# Patient Record
(demographics unavailable — no encounter records)

---

## 2024-11-25 NOTE — REVIEW OF SYSTEMS
[Hearing Loss] : hearing loss [Muscle Weakness] : muscle weakness [Negative] : Heme/Lymph [Chest Pain] : no chest pain [Shortness Of Breath] : no shortness of breath [Cough] : no cough [Constipation] : no constipation [Vomiting] : no vomiting [Incontinence] : no incontinence [Hematuria] : no hematuria [Muscle Pain] : no muscle pain [Itching] : no itching [Skin Rash] : no skin rash [Headache] : no headache [Unsteady Walk] : no ataxia [Insomnia] : no insomnia [FreeTextEntry4] : rt ear [FreeTextEntry9] : LINH

## 2024-11-25 NOTE — CURRENT MEDS
[Takes medication as prescribed] : takes [None] : Patient does not have any barriers to medication adherence [Yes] : Reviewed medication list for presence of high-risk medications. [FreeTextEntry1] : Keppra

## 2024-11-25 NOTE — PHYSICAL EXAM
[No Acute Distress] : no acute distress [Well Nourished] : well nourished [Normal Sclera/Conjunctiva] : normal sclera/conjunctiva [Normal Outer Ear/Nose] : the outer ears and nose were normal in appearance [No JVD] : no jugular venous distention [No Respiratory Distress] : no respiratory distress  [No Accessory Muscle Use] : no accessory muscle use [No Edema] : there was no peripheral edema [Normal] : normal gait, coordination grossly intact, no focal deficits and deep tendon reflexes were 2+ and symmetric [Alert and Oriented x3] : oriented to person, place, and time [de-identified] : rtt facial droop, expressive aphagia [de-identified] : has word finding issues, son assists

## 2024-11-25 NOTE — HISTORY OF PRESENT ILLNESS
[Home] : at home, [unfilled] , at the time of the visit. [Other Location: e.g. Home (Enter Location, City,State)___] : at [unfilled] [Verbal consent obtained from patient] : the patient, [unfilled] [FreeTextEntry1] : F/u post hospitalization at [facility] from 14-Nov-2024 - 18-Nov-2024 for Seizure. [de-identified] : This patient is Enrolled in the Post-Discharge [facility] Home Care Services Follow Up Program through [facility] for Continuity of Care S/P Recent Hospitalization until seen by PCP. Brief Hospital Course copied: 69 y/o Male, PMHx Left frontal/temporal hemorrhagic stroke 09/2023, HTN, sciatica/multiple spinal disc herniations with chronic back pain, Left shoulder injury, Left wrist fracture, Dupuytren contractures Right hand, prior EtOH abuse, past smoker quit 15 years ago (2 PPD x 25 years), presented to [facility] ED 11/13/24 complaining of episode of sudden-onset Right eye and facial twitching; patient remained awake throughout episode and appeared to be able to hear his son but unable to speak, and "was able to lift his arms." Patient has residual mild Right facial palsy, Right hand numbness, and mild to moderate aphasia from prior ICH. Patient seemed to be confused after episode and had severe Right sided facial droop after event per son. Patient was brought to ED by Mobile Stroke Unit, per EMS there was noted hyperdensity on CT Head, Code Stroke on arrival, NIHSS 2 per ED Provider for Right facial palsy and mild dysarthria. Last Known Well @ 15:30. CT Head with small area of acute left frontal contrast taining/enhancement vs hemorrhage. Per son, patient now back to baseline with exception of worsened expressive aphasia. Patient not a candidate for Tenecteplase due to concern for possible ICH. Patient not a candidate for mechanical thrombectomy due to no large vessel occlusion. Etiology of symptoms concerning for seizure activity given twitching episode with post-ictal confusion and return to relative baseline.   Tlevisit made with pt and his son Sammy Blankenship. Pt is alert and oriented x 3. He is breathing normally. Pt has word finding issues at times -son Sammy fills in the information. Pt denies any seizures since being home. Pt has rt sided weakness, instructed on fall precautions To keep f/u with NEURO, not to not run out of Keppa.  TCM NP reviewed that pt is under the [facility] program for home care until pt is seen by PCP. TCM NP will be assigned to sign Home Care orders post-discharge

## 2024-11-25 NOTE — ASSESSMENT
[FreeTextEntry1] : 67 y/o Male, PMHx Left frontal/temporal hemorrhagic stroke 09/2023, HTN, sciatica/multiple spinal disc herniations with chronic back pain, Left shoulder injury, Left wrist fracture, Dupuytren contractures Right hand, prior EtOH abuse, past smoker quit 15 years ago (2 PPD x 25 years), presented to [facility] ED 11/13/24 complaining of episode of sudden-onset Right eye and facial twitching;Mild diffuse/multi-focal cerebral dysfunction, not specific as to etiology. EEG d/c'd 11/16/24 in setting of negative MR imaging. Continue Keppra 750mg PO BID. S/p load Keppra 1g IVBP x1 dose.

## 2024-11-25 NOTE — ASSESSMENT
[FreeTextEntry1] : 67 y/o Male, PMHx Left frontal/temporal hemorrhagic stroke 09/2023, HTN, sciatica/multiple spinal disc herniations with chronic back pain, Left shoulder injury, Left wrist fracture, Dupuytren contractures Right hand, prior EtOH abuse, past smoker quit 15 years ago (2 PPD x 25 years), presented to [facility] ED 11/13/24 complaining of episode of sudden-onset Right eye and facial twitching;Mild diffuse/multi-focal cerebral dysfunction, not specific as to etiology. EEG d/c'd 11/16/24 in setting of negative MR imaging. Continue Keppra 750mg PO BID. S/p load Keppra 1g IVBP x1 dose.

## 2024-11-25 NOTE — ASSESSMENT
[FreeTextEntry1] : 67 y/o Male, PMHx Left frontal/temporal hemorrhagic stroke 09/2023, HTN, sciatica/multiple spinal disc herniations with chronic back pain, Left shoulder injury, Left wrist fracture, Dupuytren contractures Right hand, prior EtOH abuse, past smoker quit 15 years ago (2 PPD x 25 years), presented to [facility] ED 11/13/24 complaining of episode of sudden-onset Right eye and facial twitching;Mild diffuse/multi-focal cerebral dysfunction, not specific as to etiology. EEG d/c'd 11/16/24 in setting of negative MR imaging. Continue Keppra 750mg PO BID. S/p load Keppra 1g IVBP x1 dose.

## 2024-11-25 NOTE — HISTORY OF PRESENT ILLNESS
[Home] : at home, [unfilled] , at the time of the visit. [Other Location: e.g. Home (Enter Location, City,State)___] : at [unfilled] [Verbal consent obtained from patient] : the patient, [unfilled] [FreeTextEntry1] : F/u post hospitalization at [facility] from 14-Nov-2024 - 18-Nov-2024 for Seizure. [de-identified] : This patient is Enrolled in the Post-Discharge [facility] Home Care Services Follow Up Program through [facility] for Continuity of Care S/P Recent Hospitalization until seen by PCP. Brief Hospital Course copied: 69 y/o Male, PMHx Left frontal/temporal hemorrhagic stroke 09/2023, HTN, sciatica/multiple spinal disc herniations with chronic back pain, Left shoulder injury, Left wrist fracture, Dupuytren contractures Right hand, prior EtOH abuse, past smoker quit 15 years ago (2 PPD x 25 years), presented to [facility] ED 11/13/24 complaining of episode of sudden-onset Right eye and facial twitching; patient remained awake throughout episode and appeared to be able to hear his son but unable to speak, and "was able to lift his arms." Patient has residual mild Right facial palsy, Right hand numbness, and mild to moderate aphasia from prior ICH. Patient seemed to be confused after episode and had severe Right sided facial droop after event per son. Patient was brought to ED by Mobile Stroke Unit, per EMS there was noted hyperdensity on CT Head, Code Stroke on arrival, NIHSS 2 per ED Provider for Right facial palsy and mild dysarthria. Last Known Well @ 15:30. CT Head with small area of acute left frontal contrast taining/enhancement vs hemorrhage. Per son, patient now back to baseline with exception of worsened expressive aphasia. Patient not a candidate for Tenecteplase due to concern for possible ICH. Patient not a candidate for mechanical thrombectomy due to no large vessel occlusion. Etiology of symptoms concerning for seizure activity given twitching episode with post-ictal confusion and return to relative baseline.   Tlevisit made with pt and his son Sammy Blankenship. Pt is alert and oriented x 3. He is breathing normally. Pt has word finding issues at times -son Sammy fills in the information. Pt denies any seizures since being home. Pt has rt sided weakness, instructed on fall precautions To keep f/u with NEURO, not to not run out of Keppa.  TCM NP reviewed that pt is under the [facility] program for home care until pt is seen by PCP. TCM NP will be assigned to sign Home Care orders post-discharge

## 2024-11-25 NOTE — PHYSICAL EXAM
[No Acute Distress] : no acute distress [Well Nourished] : well nourished [Normal Sclera/Conjunctiva] : normal sclera/conjunctiva [Normal Outer Ear/Nose] : the outer ears and nose were normal in appearance [No JVD] : no jugular venous distention [No Respiratory Distress] : no respiratory distress  [No Accessory Muscle Use] : no accessory muscle use [No Edema] : there was no peripheral edema [Normal] : normal gait, coordination grossly intact, no focal deficits and deep tendon reflexes were 2+ and symmetric [Alert and Oriented x3] : oriented to person, place, and time [de-identified] : rtt facial droop, expressive aphagia [de-identified] : has word finding issues, son assists

## 2024-11-25 NOTE — PLAN
[FreeTextEntry1] : 1.  continue all medications as prescribed 2. f/u with /NEURO/CARDS/PCP 3. Maintain adequate nutrition and hydration and sleep 4. maintain seizure precautions - identify aura and prevention measures

## 2024-11-25 NOTE — HISTORY OF PRESENT ILLNESS
[Home] : at home, [unfilled] , at the time of the visit. [Other Location: e.g. Home (Enter Location, City,State)___] : at [unfilled] [Verbal consent obtained from patient] : the patient, [unfilled] [FreeTextEntry1] : F/u post hospitalization at [facility] from 14-Nov-2024 - 18-Nov-2024 for Seizure. [de-identified] : This patient is Enrolled in the Post-Discharge [facility] Home Care Services Follow Up Program through [facility] for Continuity of Care S/P Recent Hospitalization until seen by PCP. Brief Hospital Course copied: 67 y/o Male, PMHx Left frontal/temporal hemorrhagic stroke 09/2023, HTN, sciatica/multiple spinal disc herniations with chronic back pain, Left shoulder injury, Left wrist fracture, Dupuytren contractures Right hand, prior EtOH abuse, past smoker quit 15 years ago (2 PPD x 25 years), presented to [facility] ED 11/13/24 complaining of episode of sudden-onset Right eye and facial twitching; patient remained awake throughout episode and appeared to be able to hear his son but unable to speak, and "was able to lift his arms." Patient has residual mild Right facial palsy, Right hand numbness, and mild to moderate aphasia from prior ICH. Patient seemed to be confused after episode and had severe Right sided facial droop after event per son. Patient was brought to ED by Mobile Stroke Unit, per EMS there was noted hyperdensity on CT Head, Code Stroke on arrival, NIHSS 2 per ED Provider for Right facial palsy and mild dysarthria. Last Known Well @ 15:30. CT Head with small area of acute left frontal contrast taining/enhancement vs hemorrhage. Per son, patient now back to baseline with exception of worsened expressive aphasia. Patient not a candidate for Tenecteplase due to concern for possible ICH. Patient not a candidate for mechanical thrombectomy due to no large vessel occlusion. Etiology of symptoms concerning for seizure activity given twitching episode with post-ictal confusion and return to relative baseline.   Tlevisit made with pt and his son Sammy Blankenship. Pt is alert and oriented x 3. He is breathing normally. Pt has word finding issues at times -son Sammy fills in the information. Pt denies any seizures since being home. Pt has rt sided weakness, instructed on fall precautions To keep f/u with NEURO, not to not run out of Keppa.  TCM NP reviewed that pt is under the [facility] program for home care until pt is seen by PCP. TCM NP will be assigned to sign Home Care orders post-discharge

## 2024-11-25 NOTE — PHYSICAL EXAM
[No Acute Distress] : no acute distress [Well Nourished] : well nourished [Normal Sclera/Conjunctiva] : normal sclera/conjunctiva [Normal Outer Ear/Nose] : the outer ears and nose were normal in appearance [No JVD] : no jugular venous distention [No Respiratory Distress] : no respiratory distress  [No Accessory Muscle Use] : no accessory muscle use [No Edema] : there was no peripheral edema [Normal] : normal gait, coordination grossly intact, no focal deficits and deep tendon reflexes were 2+ and symmetric [Alert and Oriented x3] : oriented to person, place, and time [de-identified] : rtt facial droop, expressive aphagia [de-identified] : has word finding issues, son assists

## 2024-12-17 NOTE — DISCUSSION/SUMMARY
[FreeTextEntry1] : Mr. Garcia is a 67 yo male with history of HTN, prior alcohol abuse, who presented to [facility] on 9/7/2023 with aphasia and right-sided weakness and was found to have a large left frontal cortical intraparenchymal hemorrhage with new onset seizure 11/13/24 started on Keppra 750 mg BID.   I have personally reviewed available neuroradiological images. MRI brain w/w/o 11/16/24 - no evidence of acute infarct or acute hemorrhage.  EEG - sharp waves, left anterior temporal, F7 max, at times periodic near 0.25hz. Intermittent polymorphic delta slowing, focal, left frontotemporal. Background slowing, diffuse.   Historical: MRI brain w/wo contrast 10/8/24 revealed decrease in overall size of chronic left frontal parenchymal hemorrhage from prior MRI. No evidence of underlying enhancing lesion. Redemonstrated 3 mm inferiorly directed aneurysm arising from the right MCA bifurcation   Cerebral angiogram 9/8/2023 did not reveal a cause of the hemorrhage but showed an incidental 2 mm right MCA aneurysm.  BP in the office today is 106/62. Discussed the importance of continued blood pressure control.   PLAN: 1. Normotension.  2. Fall precautions.  3. Continue Keppra 750 mg BID 4. Routine EEG 5. Establish care with epilepsy, Dr. Cabrera 6. Continue OT/speech therapy- new referrals given 7. Repeat MRA brain 1 year for aneurysm surveillance   Follow up in 3 months or sooner if needed. All of the patient's questions and concerns were addressed.  Patient was educated about signs and symptoms of stroke and was counseled to contact 911 upon emergence of stroke-like symptoms.  The patient was asked not to drive, not to work in close proximity of machines with moving parts, not to swim unsupervised or to work at high places. The patient was to shower (without accumulation of water) instead of taking a bath if unsupervised. The patient agrees and conveys good understanding.

## 2024-12-17 NOTE — HISTORY OF PRESENT ILLNESS
[FreeTextEntry1] : Follow up chronic medical conditions  [de-identified] : Mr. MASON CHILDS is a 68 year male with a PMH of  HTN, prior alcohol abuse, intraparenchymal hemorrhage (09/2023) seeing neuro, has residual slurred speech, intermittent dizziness and hand numbness and Dupytren's in left hand who comes to the office accompanied by his son Mason for follow up of chronic medical conditions.

## 2024-12-17 NOTE — PLAN
[FreeTextEntry1] : History of brain hemorrhage Continue statins Follow up with neurology, neurosurgery and cardiology  HTN patient's BP well controlled with current medication. will continue current  regimen  Continue amlodipine current dose  History of seizure- patient will continue to take medication as prescribed by Neurologist  Prior to appointment and during encounter with patient extensive medical records were reviewed including but not limited to, Hospital records, out patient records, laboratory data and microbiology data    Total encounter total time 30 mins >50% of time spent counseling/coordinating care  Counseling included abnormal lab results, differential diagnoses, treatment options, risks and benefits, lifestyle changes, current condition, medications, and dose adjustments.  The patient was interactive, attentive, asked questions, and verbalized understanding

## 2024-12-17 NOTE — HEALTH RISK ASSESSMENT
[0] : 2) Feeling down, depressed, or hopeless: Not at all (0) [PHQ-2 Negative - No further assessment needed] : PHQ-2 Negative - No further assessment needed [No] : In the past 12 months have you used drugs other than those required for medical reasons? No [[id number]] : 0 [Former] : Former [10-14] : 10-14 [> 15 Years] : > 15 Years

## 2024-12-17 NOTE — PHYSICAL EXAM
[Normal] : affect was normal and insight and judgment were intact [de-identified] : Slurred speech.

## 2024-12-17 NOTE — HISTORY OF PRESENT ILLNESS
[FreeTextEntry1] : Mr. Garcia is a 69 yo male with history of HTN, prior alcohol abuse, who presented to [facility] on 9/7/2023 with aphasia and right-sided weakness and was found to have a large left frontal intraparenchymal hemorrhage. He presents today for follow up.   He presented to [facility] ED 11/13/24 with episode of sudden-onset right eye and facial twitching. Patient remained awake throughout episode and appeared to be able to hear his son but unable to speak with confusion following the episode. Patient was brought to ED by mobile stroke unit with questionable hyperdensity on CTH; however, low suspicion for new hemorrhage or infarct, likely contrast staining visualized on CTH. MRI brain w/w/o performed to assess for possible infarct or hemorrhage, personally reviewed with no evidence of acute infarct or acute hemorrhage. EEG revealed sharp waves, left anterior temporal, F7 max, at times periodic near 0.25hz. Intermittent polymorphic delta slowing, focal, left frontotemporal. Background slowing, diffuse. He was started on Keppra 750 mg BID. Feels fatigued on the medication. Mood okay. No seizures since. He is hoping to get home speech and OT for fine motor skills, feels both have slightly regressed. Dizziness resolved. No interval stroke symptoms, falls.

## 2024-12-17 NOTE — PHYSICAL EXAM
[FreeTextEntry1] : GENERAL APPEARANCE: Well developed, well-nourished man in no acute distress.  NEUROLOGIC EXAM:  MENTAL STATUS: Alert and Oriented to person, place and time. Mild to moderate expressive aphasia. Able to name, repeat and follow simple commands. Slow deliberate speech with mild paraphasia.   CRANIAL NERVES: CN 2:    Visual fields are full to confrontation. CN 3, 4, 6: Extraocular movements are intact. No nystagmus or ophthalmoplegia is evident. Pupils are equally round and reactive to light. CN 5:     Facial sensation is intact to light touch in all 3 divisions. CN 7:     Mild right facial droop at rest, activates well.   MOTOR: Strength is 5/5 throughout for age and stature. Dupuytren's contracture left 5th digit.  No orbiting or drift noted.  SENSORY: Intact to light touch perception in all four extremities without extinction to double simultaneous stimulation  COORD: Finger to nose testing without dysmetria bilaterally.  GAIT: Normal station. Ambulates with a single point cane with right limping gait.

## 2025-03-11 NOTE — PHYSICAL EXAM
[Well Developed] : well developed [Well Nourished] : well nourished [No Acute Distress] : no acute distress [Normal Conjunctiva] : normal conjunctiva [Normal Venous Pressure] : normal venous pressure [No Carotid Bruit] : no carotid bruit [Normal S1, S2] : normal S1, S2 [No Murmur] : no murmur [No Rub] : no rub [No Gallop] : no gallop [Clear Lung Fields] : clear lung fields [Good Air Entry] : good air entry [No Respiratory Distress] : no respiratory distress  [Soft] : abdomen soft [Non Tender] : non-tender [No Masses/organomegaly] : no masses/organomegaly [Normal Bowel Sounds] : normal bowel sounds [Abnormal Gait] : abnormal gait [No Edema] : no edema [No Cyanosis] : no cyanosis [No Clubbing] : no clubbing [No Varicosities] : no varicosities [No Rash] : no rash [No Skin Lesions] : no skin lesions [Moves all extremities] : moves all extremities [No Focal Deficits] : no focal deficits [Normal Speech] : normal speech [Alert and Oriented] : alert and oriented [Normal memory] : normal memory [de-identified] : using cane

## 2025-03-11 NOTE — PHYSICAL EXAM
[Well Developed] : well developed [Well Nourished] : well nourished [No Acute Distress] : no acute distress [Normal Conjunctiva] : normal conjunctiva [Normal Venous Pressure] : normal venous pressure [No Carotid Bruit] : no carotid bruit [Normal S1, S2] : normal S1, S2 [No Murmur] : no murmur [No Rub] : no rub [No Gallop] : no gallop [Clear Lung Fields] : clear lung fields [Good Air Entry] : good air entry [No Respiratory Distress] : no respiratory distress  [Soft] : abdomen soft [Non Tender] : non-tender [No Masses/organomegaly] : no masses/organomegaly [Normal Bowel Sounds] : normal bowel sounds [Abnormal Gait] : abnormal gait [No Edema] : no edema [No Cyanosis] : no cyanosis [No Clubbing] : no clubbing [No Varicosities] : no varicosities [No Rash] : no rash [No Skin Lesions] : no skin lesions [Moves all extremities] : moves all extremities [No Focal Deficits] : no focal deficits [Normal Speech] : normal speech [Alert and Oriented] : alert and oriented [Normal memory] : normal memory [de-identified] : using cane

## 2025-03-11 NOTE — HISTORY OF PRESENT ILLNESS
[FreeTextEntry1] : Mr. Garcia is a 67 yo male with history of HTN, prior alcohol abuse, who presented to [facility] on 9/7/2023 with aphasia and right-sided weakness and was found to have a large left frontal intraparenchymal hemorrhage. He presents today for follow up.   Routine EEG 2/22/25- focal cerebral dysfunction and increased epileptic potential in the left frontal region, intermittent left frontal rhythmic delta activity. Patient has appointment with Epilepsy, SILVER Corona right after this.   He remains on Keppra 750 mg BID, still fatigued but nothing worsening. No changes in mood. No seizures since. No interval stroke symptoms, falls.

## 2025-03-11 NOTE — CARDIOLOGY SUMMARY
[de-identified] : 3/11/25- Sinus 122, normal axis, Q-wave inferior leads, no ST abnormality, QTc 414 [de-identified] : 11/16/24: LV EF 64%, mild MR/AI

## 2025-03-11 NOTE — DISCUSSION/SUMMARY
[FreeTextEntry1] : Mr. Garcia is a 69 yo male with history of HTN, prior alcohol abuse, who presented to [facility] on 9/7/2023 with aphasia and right-sided weakness and was found to have a large left frontal cortical intraparenchymal hemorrhage with new onset seizure 11/13/24 started on Keppra 750 mg BID.   Historical: MRI brain w/w/o 11/16/24 - no evidence of acute infarct or acute hemorrhage.  EEG 11/2024 - sharp waves, left anterior temporal, F7 max, at times periodic near 0.25hz. Intermittent polymorphic delta slowing, focal, left frontotemporal. Background slowing, diffuse.   MRI brain w/wo contrast 10/8/24 revealed decrease in overall size of chronic left frontal parenchymal hemorrhage from prior MRI. No evidence of underlying enhancing lesion. Redemonstrated 3 mm inferiorly directed aneurysm arising from the right MCA bifurcation   Cerebral angiogram 9/8/2023 did not reveal a cause of the hemorrhage but showed an incidental 2 mm right MCA aneurysm.  BP in the office today is 114/70. Discussed the importance of continued blood pressure control.   Patient's last vessel imaging was 11/2024, CTA head with [facility]. Will plan for MRA brain wo contrast 11/2025 for continued surveillance of right MCA aneurysm.  PLAN: 1. Normotension.  2. Fall precautions.  3. Pending Epilepsy appointment after this to discuss continuation or any change in antiepileptic medication regimen 4. MRA brain wo contrast 11/2025 for aneurysm surveillance  Follow up in November 2025 after repeat imaging. All questions and concerns were addressed.  Patient was educated about signs and symptoms of stroke and was counseled to contact 911 upon emergence of stroke-like symptoms.  The patient was asked not to drive, not to work in close proximity of machines with moving parts, not to swim unsupervised or to work at high places. The patient was to shower (without accumulation of water) instead of taking a bath if unsupervised. The patient agrees and conveys good understanding.

## 2025-03-11 NOTE — PHYSICAL EXAM
[FreeTextEntry1] : GENERAL APPEARANCE: Well developed, well-nourished man in no acute distress.  NEUROLOGIC EXAM:  MENTAL STATUS: Alert and Oriented to person, place and time. Mild expressive aphasia. Able to name, repeat and follow simple commands. Slow deliberate speech with mild paraphasia.   CRANIAL NERVES: CN 2:    Visual fields are full to confrontation. CN 3, 4, 6: Extraocular movements are intact. No nystagmus or ophthalmoplegia is evident. Pupils are equally round and reactive to light. CN 5:     Facial sensation is intact to light touch in all 3 divisions. CN 7:     Mild right facial droop at rest, activates well.   MOTOR: Strength is 5/5 throughout for age and stature. Dupuytren's contracture left 5th digit.  No orbiting or drift noted.  SENSORY: Intact to light touch perception in all four extremities without extinction to double simultaneous stimulation  COORD: Finger to nose testing without dysmetria bilaterally.  GAIT: Normal station. Ambulates with a single point cane with right limping gait.

## 2025-03-11 NOTE — DATA REVIEWED
[de-identified] : Abnormal EEG in the awake, drowsy states. 1. Intermittent left frontal rhythmic delta activity.

## 2025-03-11 NOTE — HISTORY OF PRESENT ILLNESS
[FreeTextEntry1] : 68M h/o former smoker, former alcohol abuse, HTN, HLD, was planning for Dupuytren contracture surgery with preop EKG done with Q-wave pattern had cardiac evaluation in 2/2023 with normal Holter and LV EF 50-55% but unable to exclude regional wall abnormalities then repeat Echo in 7/2024 with normal LV EF 64% with mild MR and AI, hemorrhagic stroke 9/2023 (risk factors HTN, alcohol and frequent ASA use), seizure, presents for cardiology evaluation.    Patient presents with his adopted son (same name except Sammy ACOSTA) for the visit, he reports chronic exertional dyspnea on ambulation with chronic back pain and sciatica, limited ambulation using cane to ambulate with R-leg pain, doing light house chores only. He did not undergo the Dupuytren contract surgery after the CVA. Review of prior CT chest in 9/2023 noted diffuse coronary artery calcifications.  Stopped drinking alcohol since the stroke  Lab work 12/2024: TSH 1.01, A1c 5.7%, LDL 78   No CAD/stroke in family  Quit smoking 15yrs ago  No alcohol use since stroke from 2023

## 2025-03-11 NOTE — CARDIOLOGY SUMMARY
[de-identified] : 3/11/25- Sinus 122, normal axis, Q-wave inferior leads, no ST abnormality, QTc 414 [de-identified] : 11/16/24: LV EF 64%, mild MR/AI

## 2025-03-11 NOTE — HISTORY OF PRESENT ILLNESS
[FreeTextEntry1] : Mr. Garcia is a 69 yo male with history of HTN, prior alcohol abuse, who presented to [facility] on 9/7/2023 with aphasia and right-sided weakness and was found to have a large left frontal intraparenchymal hemorrhage. He presents today for follow up.   Routine EEG 2/22/25- focal cerebral dysfunction and increased epileptic potential in the left frontal region, intermittent left frontal rhythmic delta activity. Patient has appointment with Epilepsy, SILVER Corona right after this.   He remains on Keppra 750 mg BID, still fatigued but nothing worsening. No changes in mood. No seizures since. No interval stroke symptoms, falls.

## 2025-03-11 NOTE — HISTORY OF PRESENT ILLNESS
[FreeTextEntry1] : 67 yo male with history of HTN, prior alcohol abuse,   left frontal intraparenchymal hemorrhage 9/7/2023, seizure presents today for  seizures management.   Patient reports  on 11./13/24 he was   watching TV then  his right eye/ face started twitching, then jaw  clenched. was able to alert his son,   but was not  able to speak. was back to his baseline in EMS which was around 10mins. Taken  to [facility]  MRI brain w/w/o performed with no acute findings. 24hr  EEG revealed sharp waves, left anterior temporal  stated  on Keppra. initially was irritable on meds, but has been tolerating medications. He can take a nap after morning medications, then fine for the rest of the day. He has not had any recurrent sz, or sz-like activity. He would like to lower LEV. sleeps well.  mood is stable.      CURRENT ASM: LEV 750mg BID   EPILEPSY TYPE:  focal  HISTORY OF TONIC-CLONIC SZ:  no HISTORY OF STATUS EPILEPTICUS:  no  SEIZURE RISK FACTORS: Stroke  PREVIOUS ASM: None   IMAGING:  Brain  MRI 10/24: 1.  Chronic left frontal parenchymal hemorrhage is overall decrease in size from prior MRI. No evidence of underlying enhancing lesion. 2.  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 1. Intermittent left frontal rhythmic delta activity.  11/2025: EEG revealed sharp waves, left anterior temporal, F7 max, at times periodic near 0.25hz. Intermittent polymorphic delta slowing, focal, left frontotemporal. Background slowing, diffuse.   NEUROPSYCHOLOGY:

## 2025-03-11 NOTE — ASSESSMENT
[FreeTextEntry1] : 69 yo male with history of HTN, prior alcohol abuse, LHemorrhage. 11/213/24 with sudden onset focal aware sz-Rt facial spasms jaw clenching lasting total of 10 mins, EEG with EEG revealed sharp waves, left anterior temporal region, started on LEV 750mg BID with no recurrent events. repeat routine EEG increased LFT epileptic potential. wants to decrease medications. will obtain levels and will consider 500mg -750mg, ER can be considered,but concerned of pricing.   continue LEV 750mg BID BW for levels f/u  3mth

## 2025-03-11 NOTE — PHYSICAL EXAM
[General Appearance - In No Acute Distress] : in no acute distress [Affect] : the affect was normal [Mood] : the mood was normal [Person] : oriented to person [Place] : oriented to place [Time] : oriented to time [Current Events] : adequate knowledge of current events [Cranial Nerves Optic (II)] : visual acuity intact bilaterally,  visual fields full to confrontation, pupils equal round and reactive to light [Cranial Nerves Oculomotor (III)] : extraocular motion intact [Cranial Nerves Trigeminal (V)] : facial sensation intact symmetrically [Cranial Nerves Facial (VII)] : face symmetrical [Cranial Nerves Glossopharyngeal (IX)] : tongue and palate midline [Cranial Nerves Accessory (XI - Cranial And Spinal)] : head turning and shoulder shrug symmetric [Cranial Nerves Hypoglossal (XII)] : there was no tongue deviation with protrusion [Motor Strength] : muscle strength was normal in all four extremities [Motor Handedness Right-Handed] : the patient is right hand dominant [Sensation Tactile Decrease] : light touch was intact [] : no respiratory distress [Involuntary Movements] : no involuntary movements were seen [FreeTextEntry4] : mild-mod expressive language skills [FreeTextEntry8] : gait assitted with cane [FreeTextEntry9] : DTR symm decre.,

## 2025-03-11 NOTE — REVIEW OF SYSTEMS
[As Noted in HPI] : as noted in HPI [FreeTextEntry2] : 10 systems reviewed, as documented in HPI or negative.

## 2025-03-11 NOTE — DISCUSSION/SUMMARY
[FreeTextEntry1] : 68M h/o former smoker, former alcohol abuse, HTN, HLD, was planning for Dupuytren contracture surgery with preop EKG done with Q-wave pattern had cardiac evaluation in 2/2023 with normal Holter and LV EF 50-55% but unable to exclude regional wall abnormalities then repeat Echo in 7/2024 with normal LV EF 64% with mild MR and AI, hemorrhagic stroke 9/2023 (risk factors HTN, alcohol and frequent ASA use), seizure, presents for cardiology evaluation.   Chronic dyspnea on exertion suspect due to deconditioning, but given abnormal EKG and coronary artery calcifications will attempt cardiac CTA, however baseline sinus tachycardia may limit HR control if unable to tolerate CCTA then get pharm nuclear stress test, premed with metoprolol tartrate 100mg x1 dose and hold amlodipine the day of the CCTA.   Sinus tachycardia- recent TSH and H/H normal, can consider switching amlodipine to metoprolol for HR control.   HTN- at goal amlodipine 5mg  HLD, CAC- LDL near goal on low dose simvastatin 20mg  Leg pain, suspected PAD- will obtain VIVIAN/PVR  Hemorrhagic CVA- unclear etiology, has 2 mm R-MCA aneurysm pending surveillance MRI in 11/2025 and neurosurgery follow up   Follow up in 4 months.   [EKG obtained to assist in diagnosis and management of assessed problem(s)] : EKG obtained to assist in diagnosis and management of assessed problem(s)

## 2025-03-11 NOTE — DISCUSSION/SUMMARY
[FreeTextEntry1] : Mr. Garcia is a 69 yo male with history of HTN, prior alcohol abuse, who presented to [facility] on 9/7/2023 with aphasia and right-sided weakness and was found to have a large left frontal cortical intraparenchymal hemorrhage with new onset seizure 11/13/24 started on Keppra 750 mg BID.   Historical: MRI brain w/w/o 11/16/24 - no evidence of acute infarct or acute hemorrhage.  EEG 11/2024 - sharp waves, left anterior temporal, F7 max, at times periodic near 0.25hz. Intermittent polymorphic delta slowing, focal, left frontotemporal. Background slowing, diffuse.   MRI brain w/wo contrast 10/8/24 revealed decrease in overall size of chronic left frontal parenchymal hemorrhage from prior MRI. No evidence of underlying enhancing lesion. Redemonstrated 3 mm inferiorly directed aneurysm arising from the right MCA bifurcation   Cerebral angiogram 9/8/2023 did not reveal a cause of the hemorrhage but showed an incidental 2 mm right MCA aneurysm.  BP in the office today is 114/70. Discussed the importance of continued blood pressure control.   Patient's last vessel imaging was 11/2024, CTA head with [facility]. Will plan for MRA brain wo contrast 11/2025 for continued surveillance of right MCA aneurysm.  PLAN: 1. Normotension.  2. Fall precautions.  3. Pending Epilepsy appointment after this to discuss continuation or any change in antiepileptic medication regimen 4. MRA brain wo contrast 11/2025 for aneurysm surveillance  Follow up in November 2025 after repeat imaging. All questions and concerns were addressed.  Patient was educated about signs and symptoms of stroke and was counseled to contact 911 upon emergence of stroke-like symptoms.  The patient was asked not to drive, not to work in close proximity of machines with moving parts, not to swim unsupervised or to work at high places. The patient was to shower (without accumulation of water) instead of taking a bath if unsupervised. The patient agrees and conveys good understanding.

## 2025-06-18 NOTE — CARDIOLOGY SUMMARY
[de-identified] : 11/16/24: LV EF 64%, mild MR/AI [de-identified] : 3/11/25- Sinus 122, normal axis, Q-wave inferior leads, no ST abnormality, QTc 414 [de-identified] : 6/12/25- CCTA Ca-score 1025, moderate LAD/RCA stenosis, +FFR in RCA and distal LAD

## 2025-06-18 NOTE — CARDIOLOGY SUMMARY
[de-identified] : 3/11/25- Sinus 122, normal axis, Q-wave inferior leads, no ST abnormality, QTc 414 [de-identified] : 11/16/24: LV EF 64%, mild MR/AI [de-identified] : 6/12/25- CCTA Ca-score 1025, moderate LAD/RCA stenosis, +FFR in RCA and distal LAD

## 2025-06-18 NOTE — PHYSICAL EXAM

## 2025-06-18 NOTE — HISTORY OF PRESENT ILLNESS
[FreeTextEntry1] : 68M h/o former smoker, former alcohol abuse, HTN, HLD, was planning for Dupuytren contracture surgery with preop EKG done with Q-wave pattern had cardiac evaluation in 2/2023 with normal Holter and LV EF 50-55% but unable to exclude regional wall abnormalities then repeat Echo in 7/2024 with normal LV EF 64% with mild MR and AI, hemorrhagic stroke 9/2023 (risk factors HTN, alcohol and frequent ASA use), seizure, presents for cardiology evaluation seen on 3/2025, had VIVIAN 0.54/0.65 bilaterally and LE arterial Duplex found with severe PAD (R-SFA near total occlusion and L-SFA severe stenosis), cardiac CTA with Ca-score 1025 with at least moderate LAD/RCA stenosis with +FFR pending interventional cardiovascular evaluation with Dr. Herrera, returns for general cardiology follow up.    Patient presents with his adopted son for the visit.  Reports with severe claudication on R-leg within few minutes of walking needs to stop twice, denies chest pain, has not been on antiplatelets.    Prior visit 3/2025:  Patient presents with his adopted son (same name except Sammy ACOSTA) for the visit, he reports chronic exertional dyspnea on ambulation with chronic back pain and sciatica, limited ambulation using cane to ambulate with R-leg pain, doing light house chores only. He did not undergo the Dupuytren contract surgery after the CVA. Review of prior CT chest in 9/2023 noted diffuse coronary artery calcifications.  Stopped drinking alcohol since the stroke  Lab work 12/2024: TSH 1.01, A1c 5.7%, LDL 78   No CAD/stroke in family  Quit smoking 15yrs ago  No alcohol use since stroke from 2023

## 2025-06-18 NOTE — DISCUSSION/SUMMARY
[FreeTextEntry1] : 68M h/o former smoker, former alcohol abuse, HTN, HLD, was planning for Dupuytren contracture surgery with preop EKG done with Q-wave pattern had cardiac evaluation in 2/2023 with normal Holter and LV EF 50-55% but unable to exclude regional wall abnormalities then repeat Echo in 7/2024 with normal LV EF 64% with mild MR and AI, hemorrhagic stroke 9/2023 (risk factors HTN, alcohol and frequent ASA use), seizure, presents for cardiology evaluation seen on 3/2025, had VIVIAN 0.54/0.65 bilaterally and LE arterial Duplex found with severe PAD (R-SFA near total occlusion and L-SFA severe stenosis), cardiac CTA with Ca-score 1025 with at least moderate LAD/RCA stenosis with +FFR pending interventional cardiovascular evaluation with Dr. Herrera, returns for general cardiology follow up.    Severe PAD with R-leg claudication need peripheral angiogram/angioplasty and also left heart cath as well to confirm severe CAD, add ASA 81mg and change to atorvastatin 40mg, would need additional P2Y12 inhibitor given prior hemorrhagic stroke and cerebral aneurysm reached out to Dr. Gaston if patient can use DAPT for least 6-12 months.   Severe PAD and CAD -likely to get R-leg angioplasty first with Dr. Herrera -CAD eventual PCI after LLE angiplasty -start ASA 81mg and changes simvastatin 20mg to atorvastatin 40mg   HTN- at goal amlodipine 5mg  Hemorrhagic CVA- unclear etiology, has 2 mm R-MCA aneurysm pending surveillance MRI in 11/2025 and neurosurgery follow up  Patient relies on his son for transportation and office follow ups.   Follow up in 4 months.

## 2025-06-18 NOTE — PHYSICAL EXAM

## 2025-07-17 NOTE — PHYSICAL EXAM
[General Appearance - Well Developed] : well developed [General Appearance - Well Nourished] : well nourished [FreeTextEntry1] : as above

## 2025-07-17 NOTE — HISTORY OF PRESENT ILLNESS
[FreeTextEntry1] : 69 yo M former tobacco user here for vascular evaluation I initially saw 7/2025 for outpatient coronary/peripheral angiogram performed due to abnormal CCTA and R>LLE carlos IIb claudication, respectively. I found moderate CAD and PAD with a complex RSFA  and serial LSFA lesions. I performed investment procedure on RSFA due to inability to reenter distal true lumen.   For historical reference, patient has prior hemorrhagic cva 2023 and cerebral aneurysm.   7/2025 POST PROCEDURE VISIT: healed well. has not ambulated enough to see how claudication is. prior was 1 block R>L.  2+ CFA pulses. biphasic L>R pedal pulses. no wounds/ulcers.

## 2025-07-17 NOTE — DISCUSSION/SUMMARY
[FreeTextEntry1] : 67 yo M former tobacco user here for vascular evaluation I initially saw 7/2025 for outpatient coronary/peripheral angiogram performed due to abnormal CCTA and R>LLE carlos IIb claudication, respectively. I found moderate CAD and PAD with a complex RSFA  and serial LSFA lesions. I performed investment procedure on RSFA due to inability to reenter distal true lumen.   He has lifestyle limiting R>LLE claudication. No CLI. He does warrant intervention. I will fix left SFA and am discussing a repeat attempt at RSFA. RLE is difficult because of 1 vessel AT runoff and no real option for antegrade access. I will continue to discuss risks/benefits of  popliteal vs pedal access and if unsuccessful he does have bypass as option.   Maintain aspirin, statin. Avoid cilostazol. Needs to see podiatry. Follow after.  ER precautions given to patient.

## 2025-07-29 NOTE — HISTORY OF PRESENT ILLNESS
[FreeTextEntry1] : Sammy is a 68-year-old male who presents today with chronic exacerbated right hand and wrist discomfort as well as numbness and tingling that is bothersome during both daytime and nighttime as well as affecting his ADLs.  Separately, he presents with significant contracture of the left hand with palpable cords and inability to open his hand.  He does mention that he was scheduled for surgery for this condition however had an issue with blood pressure and surgery was subsequently canceled. Of note he mentioned he has suffered a stroke since his last visit in 2022.

## 2025-07-29 NOTE — PHYSICAL EXAM
[de-identified] : Exam [right] wrist + Durkan's w/ + NT. There is + Tinel. Patient is able to delineate numbness to median-sided digits volarly. [There is no obvious thenar atrophy]  Examination of the left hand reveals palpable cords extending into the fifth finger MP and all the way down to PIP joint with contracture of the MP at 90 degrees and the PIP at 90 degrees as well. This is not correctable. Examination of the left third finger also reveals a palpable cord extending all the way to the MP joint and past that creating a 45 degree contracture that is not correctable.   [de-identified] : [4] views of [bilateral hands and wrists] were obtained today in my office and were seen by me and discussed with the patient.  These [show findings consistent with bilateral basal joint OA and findings of IP joint OA] These show findings consistent with significant left greater than right wrist osteoarthritis - [facility]  Left hand contracture evident on imaging

## 2025-07-29 NOTE — ASSESSMENT
[FreeTextEntry1] : ASSESSMENT: The patient comes in today with chronic exacerbated right hand and wrist discomfort as well as numbness and tingling that is bothersome during both daytime and nighttime as well as affecting his ADLs.  Symptoms today are consistent with right carpal tunnel syndrome.  Treatment modalities were discussed, the patient elects for an injection which should be beneficial in both a therapeutic and diagnostic manner.  We have also discussed activity modifications and bracing for his condition.  Separately, he presents with significant contracture of the left hand with palpable cords and inability to open his hand.  He does mention that he was scheduled for surgery for this condition however had an issue with blood pressure and surgery was subsequently canceled. Of note he mentioned he has suffered a stroke since his last visit in 2022. We have discussed both operative and nonoperative modalities at this time, including Dupuytren's palmar fasciectomy surgery. He does report an extensive medical history and elects for nonoperative management at this time.    The patient was adequately and thoroughly informed of my assessment of their current condition(s).  - This may diminish bodily function for the extremity.  We discussed prognosis, treatment modalities including operative and nonoperative options for the above diagnostic assessment. As always, 2nd opinion is always provided as an option. For this, when accessible, I was able to review other physicians note(s) including reviewing other tests, imaging results as well as personally view these results for my own interpretation.      Injection Procedure: [right carpal tunnel] The risks and benefits of a steroid injection were discussed in detail. The risks include but are not limited to: pain, infection, swelling, flare response, bleeding, subcutaneous fat atrophy, skin depigmentation and/or elevation of blood sugar. The risk of incomplete resolution of symptoms, recurrence and additional intervention was reviewed and considered by the patient.  The patient agreed to proceed and under a sterile prep, I injected 1 unit (6mg) into 1 cc of a combination of Celestone and Lidocaine into the above stated location for the procedure. The patient tolerated the injection well.   The patient was adequately and thoroughly informed of my assessment of their current condition(s).   DISCUSSION: 1.  Injection as above.  Activity modifications.  Bracing. 2.  Extensive PMH.  3. [x]